# Patient Record
(demographics unavailable — no encounter records)

---

## 2024-10-09 NOTE — HISTORY OF PRESENT ILLNESS
[Pain Location] : pain [5] : a current pain level of 5/10 [Stable] : stable [Walking] : worsened by walking [Rest] : relieved by rest [de-identified] : Edy is a 60-year-old male that presents today for bilateral knee Orthovisc injection #2.

## 2024-10-09 NOTE — REASON FOR VISIT
[Follow-Up Visit] : a follow-up visit for [Other: ____] : [unfilled] [FreeTextEntry2] : Bilat knee orthovisc injection#1 lot # [id number] exp 06/06/26 [place] Counseling: I discussed with the patient the risks of ixekizumab including but not limited to immunosuppression, serious infections, worsening of inflammatory bowel disease and drug reactions.  The patient understands that monitoring is required including a PPD at baseline and must alert us or the primary physician if symptoms of infection or other concerning signs are noted.

## 2024-10-09 NOTE — DISCUSSION/SUMMARY
[de-identified] : Medication risks reviewed. Surgical risks reviewed. 69 y/o M with severe patellofemoral osteoarthritis bilaterally. XR imaging shows marked osteophytes and bone spurs. Based on his imaging, he is a future candidate for a b/l staged TKA. The surgery was discussed in detail. We also discussed exhausting conservative therapy options such as HA injections, cortisone injections, and low impact exercise.  At this point we have agreed to continue with conservative treatment.  We have agreed to proceed with repeat viscosupplementation with Orthovisc..  Injection #2 was provided to both knees today which was tolerated well.  Follow-up in 1 week for the 3rd injection bilaterally.   The patient has diabetes mellitus type 2. Their hemoglobin A1c must be below 8.0 prior to surgery in order to proceed. The patient's last known A1c was 7.0% on 8/2/24 The patient has been counseled regarding the elevated risks associated with surgical complications in patients with a BMI> 35. The patient demonstrates an understanding of the increased risk. After a lengthy discussion, the patient agreed to make a coordinated effort at weight loss prior to surgical intervention. The patient understands our BMI policy and they will make a conscious effort to improve their BMI.   The patient is a 68 year individual with end stage arthritis of their b/l knee joint. Based upon the patient's continued symptoms and failure to respond to conservative treatment I have recommended a b/staged total knee arthroplasty for this patient. A long discussion took place with the patient describing what a total joint replacement is and what the procedure would entail. A total knee arthroplasty model, similar to the implant that was used during the operation, was utilized to demonstrate and to discuss the various bearing surfaces of the implants. The hospitalization and post-operative care and rehabilitation were also discussed. The use of perioperative antibiotics and DVT prophylaxis were discussed. The risk, benefits and alternatives to a surgical intervention were discussed at length with the patient. The patient was also advised of risks related to the medical comorbidities, elevated body mass index (BMI), and smoking where applicable. We discussed how to reduce modifiable risk factors and encouraged smoking cessation were applicable. A lengthy discussion took place to review the most common complications including but not limited to: deep vein thrombosis, pulmonary embolus, heart attack, stroke, infection, wound breakdown, numbness, damage to nerves, tendon, muscles, arteries or other blood vessels, death and other possible complications from anesthesia. The patient was told that we will take steps to minimize these risks by using sterile technique, antibiotics and DVT prophylaxis when appropriate and follow the patient postoperatively in the office setting to monitor progress. The possibility of recurrent pain, no improvement in pain and actual worsening of pain were also discussed with the patient. The discharge plan of care focused on the patient going home following surgery. The patient was encouraged to make the necessary arrangements to have someone stay with them when they are discharged home. Following discharge, a home care nurse was to the patient. The home care nurse would open the patient's home care case and request home physical therapy services. Home physical therapy was to commence following discharge provided it was appropriate and covered by the health insurance benefit plan.  The benefits of surgery were discussed with the patient including the potential for improving His current clinical condition through operative intervention. Alternatives to surgical intervention including continued conservative management were also discussed in detail. All questions were answered to the satisfaction of the patient. The treatment plan of care, as well as a model of a total knee arthroplasty equivalent to the one that will be used for their total joint replacement, was shared with the patient. The patient agreed to the plan of care as well as the use of implants in their total joint replacement.

## 2024-10-09 NOTE — PROCEDURE
[de-identified] : Orthovisc injection # 2 bilateral knee     Discussed at length with the patient the planned injection. The risks, benefits, convalescence and alternatives were reviewed. The possible side effects discussed included but were not limited to: pain, swelling, heat and redness. There symptoms are generally mild but if they are extensive then contact the office. Giving pain relievers by mouth such as NSAIDs or Tylenol can generally treat the reactions to Orthovisc. Rare cases of infection have been noted. Rash, hives and itching may occur post injection. If you have muscle pain or cramps, flushing and or swelling of the face, rapid heart beat, nausea, dizziness, fever, chills, headache, difficulty breathing, swelling in the arms or legs, or have a prickly feeling of your skin, contact a health care provider immediately.     Following this discussion, the knee was prepped with Betadine and under sterile condition the Orthovisc injection was performed with a 22 gauge needle. The needle was introduced into the joint, aspiration was performed to ensure intra-articular placement and the medication was injected. Upon withdrawal of the needle the site was cleaned with alcohol and a Band-Aid applied. The patient tolerated the injection well and there were no adverse effects. Post injection instructions included no strenuous activity for 24 hours, cryotherapy and if there are any adverse effects to contact the office.      BARB

## 2024-10-16 NOTE — DISCUSSION/SUMMARY
[de-identified] : Medication risks reviewed. Surgical risks reviewed. 67 y/o M with severe patellofemoral osteoarthritis bilaterally. XR imaging shows marked osteophytes and bone spurs. Based on his imaging, he is a future candidate for a b/l staged TKA. The surgery was discussed in detail. We also discussed exhausting conservative therapy options such as HA injections, cortisone injections, and low impact exercise.  At this point we have agreed to continue with conservative treatment.  We have agreed to proceed with repeat viscosupplementation with Orthovisc..  Injection #3 was provided to both knees today which was tolerated well.  Follow-up in 6 months for re-evaluation.   The patient has diabetes mellitus type 2. Their hemoglobin A1c must be below 8.0 prior to surgery in order to proceed. The patient's last known A1c was 7.0% on 8/2/24 The patient has been counseled regarding the elevated risks associated with surgical complications in patients with a BMI> 35. The patient demonstrates an understanding of the increased risk. After a lengthy discussion, the patient agreed to make a coordinated effort at weight loss prior to surgical intervention. The patient understands our BMI policy and they will make a conscious effort to improve their BMI.   The patient is a 68 year individual with end stage arthritis of their b/l knee joint. Based upon the patient's continued symptoms and failure to respond to conservative treatment I have recommended a b/staged total knee arthroplasty for this patient. A long discussion took place with the patient describing what a total joint replacement is and what the procedure would entail. A total knee arthroplasty model, similar to the implant that was used during the operation, was utilized to demonstrate and to discuss the various bearing surfaces of the implants. The hospitalization and post-operative care and rehabilitation were also discussed. The use of perioperative antibiotics and DVT prophylaxis were discussed. The risk, benefits and alternatives to a surgical intervention were discussed at length with the patient. The patient was also advised of risks related to the medical comorbidities, elevated body mass index (BMI), and smoking where applicable. We discussed how to reduce modifiable risk factors and encouraged smoking cessation were applicable. A lengthy discussion took place to review the most common complications including but not limited to: deep vein thrombosis, pulmonary embolus, heart attack, stroke, infection, wound breakdown, numbness, damage to nerves, tendon, muscles, arteries or other blood vessels, death and other possible complications from anesthesia. The patient was told that we will take steps to minimize these risks by using sterile technique, antibiotics and DVT prophylaxis when appropriate and follow the patient postoperatively in the office setting to monitor progress. The possibility of recurrent pain, no improvement in pain and actual worsening of pain were also discussed with the patient. The discharge plan of care focused on the patient going home following surgery. The patient was encouraged to make the necessary arrangements to have someone stay with them when they are discharged home. Following discharge, a home care nurse was to the patient. The home care nurse would open the patient's home care case and request home physical therapy services. Home physical therapy was to commence following discharge provided it was appropriate and covered by the health insurance benefit plan.  The benefits of surgery were discussed with the patient including the potential for improving His current clinical condition through operative intervention. Alternatives to surgical intervention including continued conservative management were also discussed in detail. All questions were answered to the satisfaction of the patient. The treatment plan of care, as well as a model of a total knee arthroplasty equivalent to the one that will be used for their total joint replacement, was shared with the patient. The patient agreed to the plan of care as well as the use of implants in their total joint replacement.

## 2024-10-16 NOTE — PROCEDURE
[de-identified] : Orthovisc injection # 3 bilateral knee  Discussed at length with the patient the planned injection. The risks, benefits, convalescence and alternatives were reviewed. The possible side effects discussed included but were not limited to: pain, swelling, heat and redness. There symptoms are generally mild but if they are extensive then contact the office. Giving pain relievers by mouth such as NSAIDs or Tylenol can generally treat the reactions to Orthovisc. Rare cases of infection have been noted. Rash, hives and itching may occur post injection. If you have muscle pain or cramps, flushing and or swelling of the face, rapid heart beat, nausea, dizziness, fever, chills, headache, difficulty breathing, swelling in the arms or legs, or have a prickly feeling of your skin, contact a health care provider immediately.  Following this discussion, the knee was prepped with Betadine and under sterile condition the Orthovisc injection was performed with a 22 gauge needle. The needle was introduced into the joint, aspiration was performed to ensure intra-articular placement and the medication was injected. Upon withdrawal of the needle the site was cleaned with alcohol and a Band-Aid applied. The patient tolerated the injection well and there were no adverse effects. Post injection instructions included no strenuous activity for 24 hours, cryotherapy and if there are any adverse effects to contact the office.

## 2024-10-16 NOTE — REASON FOR VISIT
[Follow-Up Visit] : a follow-up visit for [Other: ____] : [unfilled] [FreeTextEntry2] :  Bilat knee orthovisc injection#3 lot # [id number] exp 06/06/26.

## 2024-10-16 NOTE — HISTORY OF PRESENT ILLNESS
[Pain Location] : pain [Stable] : stable [5] : a current pain level of 5/10 [Walking] : worsened by walking [Rest] : relieved by rest [de-identified] : Edy is a 68-year-old male that presents today for bilateral knee Orthovisc injection #3.

## 2024-11-22 NOTE — DISCUSSION/SUMMARY
[EKG obtained to assist in diagnosis and management of assessed problem(s)] : EKG obtained to assist in diagnosis and management of assessed problem(s) [FreeTextEntry1] : Here for follow up of palpitations and found to have symptomatic PVCs with burden of 9%. Will increase toprol to BID given recurrence of symptoms several hours after morning toprol dose. Discussed his hypertension and encouraged lifestyle modifications. Will plan to re-evaluate BP on increased toprol dose and following lifestyle changes in 2-3 months.   # Symptomatic PVCs - increase toprol 25mg qd to BID - check BMP and Mg to ensure electrolytes are optimized  # HTN # Obesity - BB as above - c/w losartan 50mg qd for now - lifestyle modifications - declined nutritionist referral  RTC in 3 months or sooner PRN

## 2024-11-22 NOTE — PHYSICAL EXAM
[TextEntry] : General: NAD Cardiac: nl s1s2, RRR, no mrg, no JVD, no peripheral edema Lungs: CTAB. Normal respiratory effort Vascular: lower extremities warm/well perfused Neuro: moves all extremities Psych: normal affect, normal mood

## 2024-11-22 NOTE — HISTORY OF PRESENT ILLNESS
[FreeTextEntry1] : Mr. CUEVAS is a 68 year male presenting for management of symptomatic PVCs  Notable PMHx: - Symptomatic PVCs - Atrial fibrillation s/p ablation at [facility] in ~2019 (taken off Eliquis d/t no recurrence) - HTN - HLD - DM2 - WILLIAM on CPAP - Former Smoker (quit in 2008, smoking since he was a kid) - Hypothyroidism - Knee Osteoarthritis   HPI:  Since last visit, noticing palpitations that start about 12 hours after last dose of torpol. Not as bad as it was prior to being on metoprolol. No chest pain or dyspnea.  8/22/24 holter showed symptomatic PVCs with an overall burden of 9%. Started toprol 25mg qd for this. Since start this med he feels better. Only occasional palpitations that are not causing any distress for him. This is much improved compared to prior to initiation of toprol. no chest pain. no dyspnea.   7/5/24: Patient has been noticing palpitations onset in the last week. When he has the sensation of palpitations he will palpate his pulse and notices several regular beats followed by an early beat that coincides with his symptoms. Has been increasing in duration and frequency over the last week. Had it for 24 hours on Tuesday and again on Saturday. None since. No chest pain, light-headedness, dizziness, or syncope. Saw PCP and noted FT4 mildly elevated for which his dose was adjusted. K was normal. He is planning to go to [place] at the end of the month and hopes to have this address prior to then. No changes to functional capacity. Most strenuous activity is working around the house - gardening, yard work. No issues with this.   Social Hx: alcohol use (4-5 drinks on a Friday night), works a  at a printing company, lives with wife  Data Review Labs - 8/2024: A1c 7 EKG - 11/22/2024: SR. PRWP. low voltage  - 8/2/24: SR. poor r wave progression, suspect precordial lead replacement. low voltage Echo - 10/4/23: Technically difficult study. LV systolic function normal. RV normal size/function. no noted significant valvular disease.  Stress - 10/4/23 exercise ekg: no ischemic changes. 7 Mets, 98% mPHR. normal BP and HR resposne.  Cath -  Holter - 8/2024: time anazlyed 1d 11h. no afib/flutter. 9.1% PVC burden. Symptom trigger correlate mostly with PVCs  Other -

## 2024-12-09 NOTE — HISTORY OF PRESENT ILLNESS
[de-identified] : Patient is a 68-year-old male here for follow-up of bilateral knee pain.  He reports right knee pain is worse than left.  He has been receiving cortisone and gel injections for the past 2 years.  He reports last round of HA injections in October did not help his right knee pain at all.  He reports pain is worse with exercise and ambulation. He continues to have severe pain in the right knee that is affecting his activities of daily living.  He has had physical therapy for more than 3 months. Pt uses knee braces.    Has history of diabetes last A1c was 7.

## 2024-12-09 NOTE — END OF VISIT
[FreeTextEntry3] : I, Earnestine Gill, acted solely as a scribe for Dr. Wily Barcenas on this date 12/09/2024.  I, Wily Barcenas MD, personally performed the services described in the documentation, reviewed the documentation recorded by the scribe in my presence and it accurately and completely records my words and actions.

## 2024-12-09 NOTE — PHYSICAL EXAM
[de-identified] :  GENERAL APPEARANCE: Well nourished and hydrated, pleasant, alert, and oriented x 3. Appears their stated age.  HEENT: Normocephalic, extraocular eye motion intact. Nasal septum midline. Oral cavity clear. External auditory canal clear.  RESPIRATORY: Breath sounds clear and audible in all lobes. No wheezing, No accessory muscle use. CARDIOVASCULAR: No apparent abnormalities. No lower leg edema. No varicosities. Pedal pulses are palpable. NEUROLOGIC: Sensation is normal, no muscle weakness in the upper or lower extremities. DERMATOLOGIC: No apparent skin lesions, moist, warm, no rash. SPINE: Cervical spine appears normal and moves freely; thoracic spine appears normal and moves freely; lumbosacral spine appears normal and moves freely, normal, nontender. MUSCULOSKELETAL: Hands, wrists, and elbows are normal and move freely, shoulders are normal and move freely.       [de-identified] : Musculoskeletal: not antalgic . bilateral knee exam shows generalized joint tenderness, normal alignment, range of motion 3-120 degrees. 5/5 motor strength in bilateral lower extremities.   [de-identified] : 4V xray of the bilateral knee done in the office today and reviewed by Dr. Wily Barcenas demonstrates bone-on-bone patellofemoral compartmental osteoarthritis of bilateral knees.

## 2024-12-09 NOTE — DISCUSSION/SUMMARY
[de-identified] : 67 y/o M with bone-on-bone patellofemoral osteoarthritis bilaterally. X-Ray imaging shows marked osteophytes and bone spurs. The nature of condition and treatment options were discussed in detail. Based on his imaging, he is a future candidate for a b/l staged TKA, starting with the right knee. He has worsening pain with walking, stairs, and getting up from a seated position. His quality of life is affected. He has exhausted conservative treatment for more than 3 months including gel injections cortisone injections, home therapy, physical therapy, and anti-inflammatories. I believe right TKA is reasonable for him. The pt understands his A1c should be below 8.0 prior to surgery. He will talk to surgical coordinator to schedule for a right TKA. All questions and concerns were answered.  The patient has been counseled regarding the elevated risks associated with surgical complications in patients with a BMI> 35. The patient demonstrates an understanding of the increased risk. After a lengthy discussion, the patient agreed to make a coordinated effort at weight loss prior to surgical intervention. The patient understands our BMI policy and they will make a conscious effort to improve their BMI.  The patient is a 68 year old individual with end stage arthritis of their right knee joint. The patient has exhausted a minimum of 3 months conservative treatment including prior injections (cortisone and/or hyaluronic acid injections), physical therapy, over the counter NSAIDS and pain medication where indicated. In addition, the patient's quality of life is diminished due to significant chronic pain. The patient is having difficulty with activities of daily living, including ambulating, descending stairs, and rising from a seated position. Based upon the patients continued symptoms and failure to respond to conservative treatment, I have recommended a right total knee replacement for this patient. A long discussion took place with the patient describing what a total joint replacement is and what the procedure would entail. A knee model, similar to the implant that will be used during the operation, was utilized to demonstrate and to discuss the various bearing surfaces of the implants. Implant fixation, use of cement, was also discussed with the patient. Choices of implant manufacturers, mainly [employer] and [employer], were discussed and reviewed with preference to be made by patient and surgeon prior to operation. Final selection to be based on customary practice as well as preoperative templating with selection confirmed intraoperatively based on the patient's anatomy. The patient participated and agreed with the decision-making process. The hospitalization and post-operative care and rehabilitation were also discussed. The use of perioperative antibiotics and DVT prophylaxis were discussed. The risk, benefits and alternatives to a surgical intervention were discussed at length with the patient. The patient was also advised of risks related to the medical comorbidities and elevated body mass index (BMI). A lengthy discussion took place to review the most common complications including but not limited to: deep vein thrombosis, pulmonary embolism, heart attack, stroke, infection, wound breakdown, numbness, damage to nerves, tendon, muscles, arteries or other blood vessels, death and other possible complications from anesthesia. The patient was told that we will take steps to minimize these risks by using sterile technique, antibiotics and DVT prophylaxis when appropriate and follow the patient postoperatively in the office setting to monitor progress. The possibility of recurrent pain, no improvement in pain and actual worsening of pain were also discussed with the patient.   The discharge plan of care focused on the patient going home following surgery. The patient was encouraged to make the necessary arrangements to have someone stay with them when they are discharged home. Following discharge, a home care nurse will visit the patient. The home care nurse will open your home care case and request home physical therapy services. Home physical therapy will commence following discharge provided it is appropriate and covered by the health insurance benefit plan.   The benefits of surgery were discussed with the patient including the potential for improving his/her current clinical condition through operative intervention. Alternatives to surgical intervention including continued conservative management were also discussed in detail. All questions were answered to the satisfaction of the patient. The treatment plan of care, as well as a model of a total knee equivalent to the one that will be used for their total joint replacement, was shared with the patient. The patient participated and agreed to the plan of care as well as the use of the recommended implants for their total joint replacement surgery.

## 2025-02-21 NOTE — DISCUSSION/SUMMARY
[EKG obtained to assist in diagnosis and management of assessed problem(s)] : EKG obtained to assist in diagnosis and management of assessed problem(s) [FreeTextEntry1] : Here for follow up of palpitations and found to have symptomatic PVCs with burden of 9% now improved on increase toprol dosing. He has done an excellent job with lifestyle modifications having lost 13 lbs since last visit. He is planning for right knee replacement in April 2025 with Dr. Wily Barcenas who requested perioperative cardiovascular risk startification. He tolerates > 4 METs, has no chest pain, has no dyspnea, and does not examine to be in ACS or HF. He is at low cardiovascular risk for an intermediate risk procedure without need for further cardiovascular testing.   # Perioperative Cardiovascular Risk Stratification - no further cardiac testing indicated - may hold aspirin prior to surgery but would resume as soon as deemed safe by primary/surgical team  # Symptomatic PVCs, controlled - c/w toprol 25mg BID  # HTN - BB as above - c/w losartan 50mg qd   # HLD - LDL at goal - c/w atorvastatin 20mg qd   # Obesity - on ozempic - lifestyle modifications - declined nutritionist referral  RTC in 1 year or sooner PRN C/C: Dr. Wily Barcenas

## 2025-02-21 NOTE — HISTORY OF PRESENT ILLNESS
[FreeTextEntry1] : Mr. CUEVAS is a 69 year male presenting for management of symptomatic PVCs  Notable PMHx: - Symptomatic PVCs - Atrial fibrillation s/p ablation at [facility] in ~2019 (taken off Eliquis d/t no recurrence) - HTN - HLD - DM2 - WILLIAM on CPAP - Former Smoker (quit in 2008, smoking since he was a kid) - Hypothyroidism - Knee Osteoarthritis   HPI:  Since last visit, increase toprol to BID dosing for symptomatic PVCs and feeling well without any symptoms. He is planning to have a knee replacement in April of this year with Dr. Barcenas and requesting perioperative cardiovascular risk stratification. He is able to climb a flight of stairs without stopping and without any symptoms of chest pain or dyspnea. He has improved his diet and lost 13 lbs since his last visit with me. He looks forward to resuming his daily 5 mile walks after he recovers from knee surgery. Denies chest pain, dyspnea, palpitations, light-headedness, dizziness, syncope.  11/22/24 noticing palpitations that start about 12 hours after last dose of torpol. Not as bad as it was prior to being on metoprolol. No chest pain or dyspnea.  8/22/24 holter showed symptomatic PVCs with an overall burden of 9%. Started toprol 25mg qd for this. Since start this med he feels better. Only occasional palpitations that are not causing any distress for him. This is much improved compared to prior to initiation of toprol. no chest pain. no dyspnea.   7/5/24: Patient has been noticing palpitations onset in the last week. When he has the sensation of palpitations he will palpate his pulse and notices several regular beats followed by an early beat that coincides with his symptoms. Has been increasing in duration and frequency over the last week. Had it for 24 hours on Tuesday and again on Saturday. None since. No chest pain, light-headedness, dizziness, or syncope. Saw PCP and noted FT4 mildly elevated for which his dose was adjusted. K was normal. He is planning to go to [place] at the end of the month and hopes to have this address prior to then. No changes to functional capacity. Most strenuous activity is working around the house - gardening, yard work. No issues with this.   Social Hx: alcohol use (4-5 drinks on a Friday night), works a  at a printing company, lives with wife  Data Review Labs - 8/2024: A1c 7 EKG - 2/21/25: SR. Normal EKG - 11/22/2024: SR. PRWP. low voltage  - 8/2/24: SR. poor r wave progression, suspect precordial lead replacement. low voltage Echo - 10/4/23: Technically difficult study. LV systolic function normal. RV normal size/function. no noted significant valvular disease.  Stress - 10/4/23 exercise ekg: no ischemic changes. 7 Mets, 98% mPHR. normal BP and HR response.  Cath - no prior Holter - 8/2024: time analyzed 1d 11h. no afib/flutter. 9.1% PVC burden. Symptom trigger correlate mostly with PVCs  Other -

## 2025-02-21 NOTE — HISTORY OF PRESENT ILLNESS
[FreeTextEntry1] : Mr. CUEVAS is a 69 year male presenting for management of symptomatic PVCs  Notable PMHx: - Symptomatic PVCs - Atrial fibrillation s/p ablation at [facility] in ~2019 (taken off Eliquis d/t no recurrence) - HTN - HLD - DM2 - WILLIAM on CPAP - Former Smoker (quit in 2008, smoking since he was a kid) - Hypothyroidism - Knee Osteoarthritis   HPI:  Since last visit, increase toprol to BID dosing for symptomatic PVCs and feeling well without any symptoms. He is planning to have a knee replacement in April of this year with Dr. Barcenas and requesting perioperative cardiovascular risk stratification. He is able to climb a flight of stairs without stopping and without any symptoms of chest pain or dyspnea. He has improved his diet and lost 13 lbs since his last visit with me. He looks forward to resuming his daily 5 mile walks after he recovers from knee surgery. Denies chest pain, dyspnea, palpitations, light-headedness, dizziness, syncope.  11/22/24 noticing palpitations that start about 12 hours after last dose of torpol. Not as bad as it was prior to being on metoprolol. No chest pain or dyspnea.  8/22/24 holter showed symptomatic PVCs with an overall burden of 9%. Started toprol 25mg qd for this. Since start this med he feels better. Only occasional palpitations that are not causing any distress for him. This is much improved compared to prior to initiation of toprol. no chest pain. no dyspnea.   7/5/24: Patient has been noticing palpitations onset in the last week. When he has the sensation of palpitations he will palpate his pulse and notices several regular beats followed by an early beat that coincides with his symptoms. Has been increasing in duration and frequency over the last week. Had it for 24 hours on Tuesday and again on Saturday. None since. No chest pain, light-headedness, dizziness, or syncope. Saw PCP and noted FT4 mildly elevated for which his dose was adjusted. K was normal. He is planning to go to [place] at the end of the month and hopes to have this address prior to then. No changes to functional capacity. Most strenuous activity is working around the house - gardening, yard work. No issues with this.   Social Hx: alcohol use (4-5 drinks on a Friday night), works a  at a printing company, lives with wife  Data Review Labs - 8/2024: A1c 7 EKG - 2/21/25: SR. Normal EKG - 11/22/2024: SR. PRWP. low voltage  - 8/2/24: SR. poor r wave progression, suspect precordial lead replacement. low voltage Echo - 10/4/23: Technically difficult study. LV systolic function normal. RV normal size/function. no noted significant valvular disease.  Stress - 10/4/23 exercise ekg: no ischemic changes. 7 Mets, 98% mPHR. normal BP and HR response.  Cath - no prior Holter - 8/2024: time analyzed 1d 11h. no afib/flutter. 9.1% PVC burden. Symptom trigger correlate mostly with PVCs  Other -

## 2025-04-06 NOTE — HISTORY OF PRESENT ILLNESS
[de-identified] : The patient is here for a medical clearance prior to planned right TKR by Dr. Barcenas on 4/22/25 at [facility].  He can walk 2-3 miles and is limited only by his knee.  He can go up two flights of stairs.  No exertional chest pain or dyspnea.  No orthopnea, leg edema, palpitations.

## 2025-04-06 NOTE — ASSESSMENT
[FreeTextEntry1] : The patient is at low medical risk for the planned procedure.  He has no symptoms to suggest cardiac ischemia and he has good exercise tolerance. The BP is good at 120/70.  The EKG is unremarkable.  He saw his cardiologist- see the cardiac clearance done there.    He will avoid aspirin for seven days prior to surgery and avoid NSAIDS.  Routine DVT prophylaxis advised as per the surgeon.  He can use an incentive spirometer postoperatively.  He has obstructive sleep apnea and he should be monitored postoperatively.    Presurgical blood tests reviewed- HgBA1c 6.6, TFTs fine, CBC, metabolic, PTPTT unremarkable.    He was advised to take Ozempic on 4/10/25 and skip the 4/17/25 dose (surgery on 4/22).  Stop Jardiance four days prior to surgery.  Stop Metformin two days prior to surgery.  See the clearance form for more information.

## 2025-05-13 NOTE — HISTORY OF PRESENT ILLNESS
[Chills] : no chills [Constipation] : no constipation [Diarrhea] : no diarrhea [Dysuria] : no dysuria [Fever] : no fever [Nausea] : no nausea [Vomiting] : no vomiting [Doing Well] : is doing well [No Sign of Infection] : is showing no signs of infection [Adequate Pain Control] : has adequate pain control [de-identified] : s/p Right total knee arthroplasty. Computer-assisted tibia resection, OrthAlign procedure DOS:04/22/25 [de-identified] : Patient is 3 weeks from right total knee arthroplasty is walking with a cane he completed home physical therapy is taking Advil pain management he takes aspirin twice a day for DVT prophylaxis [de-identified] :  right knee  exam shows healing incision with no sign of infection, ROM 5-100. The surgical incision site(s) swollen, but clean, dry and intact, healed, not erythematous and not dehisced. Additional findings included an unremarkable neurological exam, peripheral vascular exam normal and maneuvers demonstrated a negative Tor's sign.   [de-identified] :  3V xray of the right knee done in the office today and reviewed and demonstrates s/p implants in good positioning with no evidence of wear, loosening, or subsidence.   [de-identified] : Patient will start out pt physical therapy and low impact exercise. continue elevated, ice, and pain management.   Continue DVTP medication.    follow-up in  6 weeks

## 2025-07-02 NOTE — HISTORY OF PRESENT ILLNESS
[Chills] : no chills [Constipation] : no constipation [Diarrhea] : no diarrhea [Dysuria] : no dysuria [Fever] : no fever [Nausea] : no nausea [Vomiting] : no vomiting [Clean/Dry/Intact] : clean, dry and intact [Healed] : healed [Erythema] : not erythematous [Discharge] : absent of discharge [Swelling] : swollen [Dehiscence] : not dehisced [Neuro Intact] : an unremarkable neurological exam [Vascular Intact] : ~T peripheral vascular exam normal [Negative Tor's] : maneuvers demonstrated a negative Tor's sign [Xray (Date:___)] : [unfilled] Xray -  [de-identified] : s/p Right total knee arthroplasty. Computer-assisted tibia resection, OrthAlign procedure DOS:04/22/25. [de-identified] : 70 y/o M is 10 weeks postop right total knee replacement.  He reports he is doing very well.  He has minimal pain and numbness in the right knee.  He has made good progress with physical therapy and has his last session tomorrow. [de-identified] : Right knee exam well-healed incision no sign of infection range of motion 0/125  [de-identified] : Three-view imaging of the right knee shows well-fixed implants no subsidence loosening or wear [de-identified] : Postoperatively, the patient is doing well, has excellent pain control and is showing no signs of infection.  [de-identified] : Patient will continue with the physical therapy and low impact exercise. I discussed use of antibiotic before dental work for 2 years.  Follow-up 1 year postop for radiographic surveillance.

## 2025-07-02 NOTE — END OF VISIT
[FreeTextEntry3] : Documented by Earnestine Gill acting solely as a scribe for Dr. Wily Barcenas on this date 07/02/2025.   All Medical record entries made by the Scribe were at my, Dr. Wily Barcenas's, direction and personally dictated by me on 07/02/2025. I have reviewed the chart and agree that the record accurately reflects my personal performance of the history, physical exam, assessment and plan. I have also personally directed, reviewed, and agreed with the discharge instructions.

## 2025-07-11 NOTE — ASSESSMENT
[FreeTextEntry1] : Discussed diet, exercise and weight loss. The BP is fine at 125/80.  He sees his cardiologist.  Check lipids on Atorvastatin.  Check a HgBa1c.  Weight loss discussed.  Check TFTs- on Levothyroxine 225 mcg.  Colonoscopy overdue and advised.  No  symptoms.  he will see a urologist- history of bladder cancer.  Check a PSA.  He quit smoking about 15 years ago and he will go for a screening chest CT.  S/p Prevnar 20 and Shingrix.  COVID-19 vaccine discussed.  Routine ophthalmology visit advised.    Monitor the B-12.  [Vaccines Reviewed] : Immunizations reviewed today. Please see immunization details in the vaccine log within the immunization flowsheet.

## 2025-07-11 NOTE — HISTORY OF PRESENT ILLNESS
[FreeTextEntry1] : The patient is here for a routine visit.  [de-identified] : He is active and walks.  No chest pain or dyspnea.  He had gained weight but has now lost a few pounds and weight is stable overall.  He tries to watch his diet.  He had the TKR which he says went well.  He plans to retire at the end of the year.